# Patient Record
Sex: FEMALE
[De-identification: names, ages, dates, MRNs, and addresses within clinical notes are randomized per-mention and may not be internally consistent; named-entity substitution may affect disease eponyms.]

---

## 2022-11-30 PROBLEM — Z00.00 ENCOUNTER FOR PREVENTIVE HEALTH EXAMINATION: Status: ACTIVE | Noted: 2022-11-30

## 2022-12-07 ENCOUNTER — APPOINTMENT (OUTPATIENT)
Dept: PHYSICAL MEDICINE AND REHAB | Facility: CLINIC | Age: 72
End: 2022-12-07

## 2022-12-07 VITALS
SYSTOLIC BLOOD PRESSURE: 119 MMHG | BODY MASS INDEX: 34.78 KG/M2 | HEART RATE: 78 BPM | WEIGHT: 189 LBS | HEIGHT: 62 IN | RESPIRATION RATE: 18 BRPM | DIASTOLIC BLOOD PRESSURE: 81 MMHG

## 2022-12-07 DIAGNOSIS — R52 PAIN, UNSPECIFIED: ICD-10-CM

## 2022-12-07 DIAGNOSIS — R27.8 OTHER LACK OF COORDINATION: ICD-10-CM

## 2022-12-07 DIAGNOSIS — M25.521 PAIN IN RIGHT ELBOW: ICD-10-CM

## 2022-12-07 DIAGNOSIS — R20.0 ANESTHESIA OF SKIN: ICD-10-CM

## 2022-12-07 DIAGNOSIS — G56.21 LESION OF ULNAR NERVE, RIGHT UPPER LIMB: ICD-10-CM

## 2022-12-07 DIAGNOSIS — Z86.69 PERSONAL HISTORY OF OTHER DISEASES OF THE NERVOUS SYSTEM AND SENSE ORGANS: ICD-10-CM

## 2022-12-07 DIAGNOSIS — G89.29 PAIN IN RIGHT ELBOW: ICD-10-CM

## 2022-12-07 DIAGNOSIS — M79.644 PAIN IN RIGHT FINGER(S): ICD-10-CM

## 2022-12-07 PROCEDURE — 99204 OFFICE O/P NEW MOD 45 MIN: CPT

## 2022-12-07 RX ORDER — IBUPROFEN 400 MG/1
400 TABLET, FILM COATED ORAL
Qty: 30 | Refills: 0 | Status: ACTIVE | COMMUNITY
Start: 2022-08-10

## 2022-12-07 RX ORDER — METHYLPREDNISOLONE 4 MG/1
4 TABLET ORAL
Qty: 21 | Refills: 0 | Status: ACTIVE | COMMUNITY
Start: 2022-06-15

## 2022-12-07 RX ORDER — AZELASTINE HYDROCHLORIDE 137 UG/1
0.1 SPRAY, METERED NASAL
Qty: 30 | Refills: 0 | Status: ACTIVE | COMMUNITY
Start: 2022-06-15

## 2022-12-07 RX ORDER — OXYCODONE AND ACETAMINOPHEN 5; 325 MG/1; MG/1
5-325 TABLET ORAL
Qty: 20 | Refills: 0 | Status: ACTIVE | COMMUNITY
Start: 2022-08-10

## 2022-12-07 NOTE — HISTORY OF PRESENT ILLNESS
[FreeTextEntry1] : Ms. MC ZURITA is a very pleasant 72 year female who seen for evaluation of R hand pain numbness and burning   that has been ongoing for 4 months   without any specific injury or inciting event but noted after R rotator cuff sx at [facility] . The pain is located primarily R elbow  intermittent in nature and described as aching cramping and the thumb now burns -she has numbness 4th and 5th digits R hand . The pain is rated as 8/10 during today's visit, and ranges from 0-10/10. The patient's symptoms are aggravated by lying down or use   and alleviated by nothing  . The patient denies any other  night pain, L numbness/tingling, weakness but has trouble finding change in her purse and there is pay pinching or gripping , no bowel/bladder dysfunction. 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

## 2022-12-07 NOTE — ASSESSMENT
[FreeTextEntry1] : \par PLAN AND RECOMMENDATIONS :\par \par We discussed differential diagnosis and clinical impression\par agree with EMG rule out cubital or carpal tunnel \par also advise rheum labs given the severe thumb joint pain \par \par Recommend\par .symptomatic care and support\par  medications NSAIDS as needed -  OTC fine (-personal preference )-(once or twice a day), -warned of  possible GI side effects -advised to take with meals or add over the counter Nexium, if sensitive\par \par  imaging as per ortho \par \par  hydrotherapy /heat / cold for pain\par  continue  ergonomic precautions including pacing ,posture and frequent breaks while typing.\par \par  relative rest and avoidance of painful activity where possible \par  increasing activity as discussed \par  return for EMG \par Information given to patient about EMG and Nerve Conduction Study Examination including  planning, differential diagnosis to rule in /rule out ,duration of the test ,precautions (if patient on blood thinner.has bleeding disorder or  pace maker device etc -still possible to undergo with care), side effects(benign-limited to short term bruising and discomfort/pain)  \par The protocol of temp checks upon arrival ,disinfection procedure of waiting room and the lab explained- reassured. \par All questions answered. \par Patient instructed to book appointment upon conclusion of appointment\par \par Information sheet ' Answers to your Questions on EMG " forwarded to patient to read prior to testing, with further information about training,background and the procedure itself .\par \par Total clinician time on the date of this encounter was at least 45 minutes- including\par \par 1 preparing to see the patient and review of prior notes, tests and other records \par 2 obtaining and reviewing and/ or confirming the history\par 3 performing a medically necessary, pertinent  and appropriate examination \par 4 ordering medications and supplements explaining side effects to look for ,tests,procedures,therapy and or specialty referrals\par 5 explaining the diagnosis or differential to the patient \par 6 communicating with other physicians or providers before during or after the visit. will CALL DR Morales after the testing to confer results \par \par

## 2022-12-07 NOTE — REVIEW OF SYSTEMS
[Joint Pain] : joint pain [Negative] : Heme/Lymph [Fever] : no fever [Recent Change In Weight] : ~T no recent weight change [Lower Ext Edema] : no lower extremity edema

## 2022-12-12 LAB
CK SERPL-CCNC: 140 U/L
CRP SERPL-MCNC: <3 MG/L
ERYTHROCYTE [SEDIMENTATION RATE] IN BLOOD BY WESTERGREN METHOD: 47 MM/HR
RBC # BLD: 3.87 M/UL
RETICS # AUTO: 2 %
RETICS AGGREG/RBC NFR: 77.4 K/UL
RHEUMATOID FACT SER QL: 45 IU/ML
TSH SERPL-ACNC: 3.06 UIU/ML
URATE SERPL-MCNC: 5.3 MG/DL

## 2022-12-14 ENCOUNTER — APPOINTMENT (OUTPATIENT)
Dept: PHYSICAL MEDICINE AND REHAB | Facility: CLINIC | Age: 72
End: 2022-12-14

## 2022-12-14 DIAGNOSIS — G56.21 LESION OF ULNAR NERVE, RIGHT UPPER LIMB: ICD-10-CM

## 2022-12-14 DIAGNOSIS — G56.03 CARPAL TUNNEL SYNDROM,BILATERAL UPPER LIMBS: ICD-10-CM

## 2022-12-14 LAB
ANA PAT FLD IF-IMP: ABNORMAL
ANA SER IF-ACNC: ABNORMAL

## 2022-12-14 PROCEDURE — 95886 MUSC TEST DONE W/N TEST COMP: CPT

## 2022-12-14 PROCEDURE — 95913 NRV CNDJ TEST 13/> STUDIES: CPT

## 2022-12-14 NOTE — PROCEDURE
[de-identified] : EMG /NERVE CONDUCTION STUDY performed today without complication\par \par Tabulated data, wave forms , conclusions and recommendations are attached and in the procedure report. \par Please refer to the scanned study attached to this encounter\par \par Full history and focused clinical exam performed prior to the examination and documented in report\par